# Patient Record
Sex: FEMALE | Race: ASIAN | NOT HISPANIC OR LATINO | Employment: FULL TIME | URBAN - METROPOLITAN AREA
[De-identification: names, ages, dates, MRNs, and addresses within clinical notes are randomized per-mention and may not be internally consistent; named-entity substitution may affect disease eponyms.]

---

## 2023-04-28 ENCOUNTER — OFFICE VISIT (OUTPATIENT)
Dept: PRIMARY CARE | Facility: CLINIC | Age: 53
End: 2023-04-28
Payer: COMMERCIAL

## 2023-04-28 VITALS
BODY MASS INDEX: 23.74 KG/M2 | HEIGHT: 63 IN | SYSTOLIC BLOOD PRESSURE: 107 MMHG | DIASTOLIC BLOOD PRESSURE: 66 MMHG | HEART RATE: 88 BPM | WEIGHT: 134 LBS | OXYGEN SATURATION: 97 %

## 2023-04-28 DIAGNOSIS — Z12.31 ENCOUNTER FOR SCREENING MAMMOGRAM FOR MALIGNANT NEOPLASM OF BREAST: ICD-10-CM

## 2023-04-28 DIAGNOSIS — N94.6 DYSMENORRHEA: ICD-10-CM

## 2023-04-28 DIAGNOSIS — Z00.00 ANNUAL PHYSICAL EXAM: Primary | ICD-10-CM

## 2023-04-28 DIAGNOSIS — Z12.11 ENCOUNTER FOR SCREENING FOR MALIGNANT NEOPLASM OF COLON: ICD-10-CM

## 2023-04-28 DIAGNOSIS — R79.0 ABNORMAL SERUM IRON LEVEL: ICD-10-CM

## 2023-04-28 DIAGNOSIS — R89.9 ABNORMAL LABORATORY TEST RESULT: ICD-10-CM

## 2023-04-28 LAB
ALANINE AMINOTRANSFERASE (SGPT) (U/L) IN SER/PLAS: 14 U/L (ref 7–45)
ALBUMIN (G/DL) IN SER/PLAS: 4.7 G/DL (ref 3.4–5)
ALKALINE PHOSPHATASE (U/L) IN SER/PLAS: 74 U/L (ref 33–110)
ANION GAP IN SER/PLAS: 15 MMOL/L (ref 10–20)
ASPARTATE AMINOTRANSFERASE (SGOT) (U/L) IN SER/PLAS: 16 U/L (ref 9–39)
BILIRUBIN TOTAL (MG/DL) IN SER/PLAS: 1.3 MG/DL (ref 0–1.2)
CALCIUM (MG/DL) IN SER/PLAS: 9.6 MG/DL (ref 8.6–10.6)
CARBON DIOXIDE, TOTAL (MMOL/L) IN SER/PLAS: 28 MMOL/L (ref 21–32)
CHLORIDE (MMOL/L) IN SER/PLAS: 104 MMOL/L (ref 98–107)
CHOLESTEROL (MG/DL) IN SER/PLAS: 196 MG/DL (ref 0–199)
CHOLESTEROL IN HDL (MG/DL) IN SER/PLAS: 57.6 MG/DL
CHOLESTEROL/HDL RATIO: 3.4
CREATININE (MG/DL) IN SER/PLAS: 0.8 MG/DL (ref 0.5–1.05)
ERYTHROCYTE DISTRIBUTION WIDTH (RATIO) BY AUTOMATED COUNT: 12.3 % (ref 11.5–14.5)
ERYTHROCYTE MEAN CORPUSCULAR HEMOGLOBIN CONCENTRATION (G/DL) BY AUTOMATED: 34.7 G/DL (ref 32–36)
ERYTHROCYTE MEAN CORPUSCULAR VOLUME (FL) BY AUTOMATED COUNT: 93 FL (ref 80–100)
ERYTHROCYTES (10*6/UL) IN BLOOD BY AUTOMATED COUNT: 4.99 X10E12/L (ref 4–5.2)
ESTIMATED AVERAGE GLUCOSE FOR HBA1C: 91 MG/DL
FERRITIN (UG/LL) IN SER/PLAS: 96 UG/L (ref 8–150)
GFR FEMALE: 88 ML/MIN/1.73M2
GLUCOSE (MG/DL) IN SER/PLAS: 84 MG/DL (ref 74–99)
HEMATOCRIT (%) IN BLOOD BY AUTOMATED COUNT: 46.4 % (ref 36–46)
HEMOGLOBIN (G/DL) IN BLOOD: 16.1 G/DL (ref 12–16)
HEMOGLOBIN A1C/HEMOGLOBIN TOTAL IN BLOOD: 4.8 %
IRON (UG/DL) IN SER/PLAS: 267 UG/DL (ref 35–150)
IRON BINDING CAPACITY (UG/DL) IN SER/PLAS: 357 UG/DL (ref 240–445)
IRON SATURATION (%) IN SER/PLAS: 75 % (ref 25–45)
LDL: 118 MG/DL (ref 0–99)
LEUKOCYTES (10*3/UL) IN BLOOD BY AUTOMATED COUNT: 5.5 X10E9/L (ref 4.4–11.3)
NRBC (PER 100 WBCS) BY AUTOMATED COUNT: 0 /100 WBC (ref 0–0)
PLATELETS (10*3/UL) IN BLOOD AUTOMATED COUNT: 185 X10E9/L (ref 150–450)
POTASSIUM (MMOL/L) IN SER/PLAS: 4.1 MMOL/L (ref 3.5–5.3)
PROTEIN TOTAL: 6.9 G/DL (ref 6.4–8.2)
SODIUM (MMOL/L) IN SER/PLAS: 143 MMOL/L (ref 136–145)
THYROTROPIN (MIU/L) IN SER/PLAS BY DETECTION LIMIT <= 0.05 MIU/L: 2.74 MIU/L (ref 0.44–3.98)
TRANSFERRIN (MG/DL) IN SER/PLAS: 248 MG/DL (ref 200–360)
TRIGLYCERIDE (MG/DL) IN SER/PLAS: 104 MG/DL (ref 0–149)
UREA NITROGEN (MG/DL) IN SER/PLAS: 16 MG/DL (ref 6–23)
VLDL: 21 MG/DL (ref 0–40)

## 2023-04-28 PROCEDURE — 80053 COMPREHEN METABOLIC PANEL: CPT

## 2023-04-28 PROCEDURE — 84443 ASSAY THYROID STIM HORMONE: CPT

## 2023-04-28 PROCEDURE — 82728 ASSAY OF FERRITIN: CPT

## 2023-04-28 PROCEDURE — 99396 PREV VISIT EST AGE 40-64: CPT | Performed by: INTERNAL MEDICINE

## 2023-04-28 PROCEDURE — 83540 ASSAY OF IRON: CPT

## 2023-04-28 PROCEDURE — 84466 ASSAY OF TRANSFERRIN: CPT

## 2023-04-28 PROCEDURE — 36415 COLL VENOUS BLD VENIPUNCTURE: CPT | Performed by: INTERNAL MEDICINE

## 2023-04-28 PROCEDURE — 1036F TOBACCO NON-USER: CPT | Performed by: INTERNAL MEDICINE

## 2023-04-28 PROCEDURE — 83036 HEMOGLOBIN GLYCOSYLATED A1C: CPT

## 2023-04-28 PROCEDURE — 80061 LIPID PANEL: CPT

## 2023-04-28 PROCEDURE — 85027 COMPLETE CBC AUTOMATED: CPT

## 2023-04-28 ASSESSMENT — PATIENT HEALTH QUESTIONNAIRE - PHQ9
SUM OF ALL RESPONSES TO PHQ9 QUESTIONS 1 AND 2: 0
2. FEELING DOWN, DEPRESSED OR HOPELESS: NOT AT ALL
1. LITTLE INTEREST OR PLEASURE IN DOING THINGS: NOT AT ALL

## 2023-04-28 NOTE — PROGRESS NOTES
"Subjective   Patient ID: Paradise Paris is a 52 y.o. female who presents for Annual Exam (Patient here for complete physical exam).      Review of Systems  Heavy menstrual blood loss history  Absence of menstruation 4 months    Objective   /66   Pulse 88   Ht 1.6 m (5' 3\")   Wt 60.8 kg (134 lb)   SpO2 97%   BMI 23.74 kg/m²     Physical Exam  NAD. Cooperative.  HEENT: WNL  Neck: WNL  Lungs CTA  Heart: RRR  Abdomen: WNL  Musculoskeletal system: WNL  Neurologic exam: WNL    Assessment/Plan   Diagnoses and all orders for this visit:  Annual physical exam  -     CBC; Future  -     TSH; Future  -     Hemoglobin A1C; Future  -     Lipid panel; Future  -     Comprehensive metabolic panel; Future  Encounter for screening mammogram for malignant neoplasm of breast  -     BI mammo bilateral screening tomosynthesis; Future  Encounter for screening for malignant neoplasm of colon  -     Cologuard® colon cancer screening; Future  Dysmenorrhea  -     CBC; Future  -     Iron and TIBC; Future  -     Ferritin; Future  -     Transferrin; Future    Tdap vaccine is due, postponed by the patient.  GYN exam is recommended, scheduling contact was provided.     "

## 2023-05-19 LAB — NONINV COLON CA DNA+OCC BLD SCRN STL QL: NEGATIVE

## 2023-07-19 ENCOUNTER — OFFICE VISIT (OUTPATIENT)
Dept: PRIMARY CARE | Facility: CLINIC | Age: 53
End: 2023-07-19
Payer: COMMERCIAL

## 2023-07-19 VITALS
DIASTOLIC BLOOD PRESSURE: 70 MMHG | BODY MASS INDEX: 23.21 KG/M2 | SYSTOLIC BLOOD PRESSURE: 120 MMHG | HEART RATE: 85 BPM | OXYGEN SATURATION: 97 % | WEIGHT: 131 LBS

## 2023-07-19 DIAGNOSIS — R89.9 ABNORMAL LABORATORY TEST RESULT: Primary | ICD-10-CM

## 2023-07-19 PROBLEM — E78.00 ELEVATED LDL CHOLESTEROL LEVEL: Status: ACTIVE | Noted: 2023-07-19

## 2023-07-19 PROBLEM — L30.9 ECZEMA: Status: ACTIVE | Noted: 2023-07-19

## 2023-07-19 PROBLEM — D58.2 ELEVATED HEMOGLOBIN (CMS-HCC): Status: ACTIVE | Noted: 2023-07-19

## 2023-07-19 PROBLEM — R92.30 DENSE BREAST TISSUE ON MAMMOGRAM: Status: ACTIVE | Noted: 2023-07-19

## 2023-07-19 PROBLEM — K21.9 ACID REFLUX: Status: ACTIVE | Noted: 2023-07-19

## 2023-07-19 LAB
ERYTHROCYTE DISTRIBUTION WIDTH (RATIO) BY AUTOMATED COUNT: 12.4 % (ref 11.5–14.5)
ERYTHROCYTE MEAN CORPUSCULAR HEMOGLOBIN CONCENTRATION (G/DL) BY AUTOMATED: 35.3 G/DL (ref 32–36)
ERYTHROCYTE MEAN CORPUSCULAR VOLUME (FL) BY AUTOMATED COUNT: 92 FL (ref 80–100)
ERYTHROCYTES (10*6/UL) IN BLOOD BY AUTOMATED COUNT: 5.03 X10E12/L (ref 4–5.2)
FERRITIN (UG/LL) IN SER/PLAS: 129 UG/L (ref 8–150)
HEMATOCRIT (%) IN BLOOD BY AUTOMATED COUNT: 46.5 % (ref 36–46)
HEMOGLOBIN (G/DL) IN BLOOD: 16.4 G/DL (ref 12–16)
IRON (UG/DL) IN SER/PLAS: 162 UG/DL (ref 35–150)
IRON BINDING CAPACITY (UG/DL) IN SER/PLAS: 352 UG/DL (ref 240–445)
IRON SATURATION (%) IN SER/PLAS: 46 % (ref 25–45)
LEUKOCYTES (10*3/UL) IN BLOOD BY AUTOMATED COUNT: 5.5 X10E9/L (ref 4.4–11.3)
NRBC (PER 100 WBCS) BY AUTOMATED COUNT: 0 /100 WBC (ref 0–0)
PLATELETS (10*3/UL) IN BLOOD AUTOMATED COUNT: 178 X10E9/L (ref 150–450)
TRANSFERRIN (MG/DL) IN SER/PLAS: 241 MG/DL (ref 200–360)

## 2023-07-19 PROCEDURE — 1036F TOBACCO NON-USER: CPT | Performed by: INTERNAL MEDICINE

## 2023-07-19 PROCEDURE — 99213 OFFICE O/P EST LOW 20 MIN: CPT | Performed by: INTERNAL MEDICINE

## 2023-07-19 PROCEDURE — 83540 ASSAY OF IRON: CPT

## 2023-07-19 PROCEDURE — 85027 COMPLETE CBC AUTOMATED: CPT

## 2023-07-19 PROCEDURE — 84466 ASSAY OF TRANSFERRIN: CPT

## 2023-07-19 PROCEDURE — 82728 ASSAY OF FERRITIN: CPT

## 2023-07-19 NOTE — PROGRESS NOTES
Subjective   Patient ID: Paradise Paris is a 52 y.o. female who presents for Follow-up (Patient here for follow-up visit for low iron).    HPI   Lab follow up, elevated transferrin saturation and total serum Iron.  No complaints  Review of Systems  Negative   Objective   Pulse 85   Wt 59.4 kg (131 lb)   SpO2 97%   BMI 23.21 kg/m²     Physical Exam  NAD. Cooperative.  Lungs CTA  Heart: RRR      Assessment/Plan   Diagnoses and all orders for this visit:  Abnormal laboratory test result  -     Ferritin; Future  -     Transferrin; Future  -     Iron and TIBC; Future  -     CBC; Future  -     Iron and TIBC  Review of the lab results, recommendations to follow.

## 2024-04-18 ENCOUNTER — OFFICE VISIT (OUTPATIENT)
Dept: PRIMARY CARE | Facility: CLINIC | Age: 54
End: 2024-04-18
Payer: COMMERCIAL

## 2024-04-18 VITALS
HEART RATE: 83 BPM | DIASTOLIC BLOOD PRESSURE: 70 MMHG | BODY MASS INDEX: 23.74 KG/M2 | SYSTOLIC BLOOD PRESSURE: 120 MMHG | WEIGHT: 134 LBS | OXYGEN SATURATION: 98 %

## 2024-04-18 DIAGNOSIS — E78.00 ELEVATED LDL CHOLESTEROL LEVEL: ICD-10-CM

## 2024-04-18 DIAGNOSIS — Z28.21 REFUSED DIPHTHERIA-TETANUS VACCINE: ICD-10-CM

## 2024-04-18 DIAGNOSIS — Z00.00 ANNUAL PHYSICAL EXAM: Primary | ICD-10-CM

## 2024-04-18 DIAGNOSIS — Z12.31 ENCOUNTER FOR SCREENING MAMMOGRAM FOR MALIGNANT NEOPLASM OF BREAST: ICD-10-CM

## 2024-04-18 DIAGNOSIS — Z86.39 HISTORY OF THYROID NODULE: ICD-10-CM

## 2024-04-18 DIAGNOSIS — D58.2 ELEVATED HEMOGLOBIN (CMS-HCC): ICD-10-CM

## 2024-04-18 PROCEDURE — 1036F TOBACCO NON-USER: CPT | Performed by: INTERNAL MEDICINE

## 2024-04-18 PROCEDURE — G0439 PPPS, SUBSEQ VISIT: HCPCS | Performed by: INTERNAL MEDICINE

## 2024-04-18 ASSESSMENT — PATIENT HEALTH QUESTIONNAIRE - PHQ9
2. FEELING DOWN, DEPRESSED OR HOPELESS: NOT AT ALL
1. LITTLE INTEREST OR PLEASURE IN DOING THINGS: NOT AT ALL
SUM OF ALL RESPONSES TO PHQ9 QUESTIONS 1 AND 2: 0

## 2024-04-18 NOTE — PROGRESS NOTES
Subjective   Patient ID: Paradise Paris is a 53 y.o. female who presents for Annual Exam (Patient here for complete physical exam).    HPI   The patient presents for an annual wellness exam with no concerns.    Review of Systems  Negative  Objective   /70   Pulse 83   Wt 60.8 kg (134 lb)   SpO2 98%   BMI 23.74 kg/m²     Physical Exam  NAD. Cooperative.  HEENT: WNL  Neck: WNL  Lungs CTA  Heart: RRR  Abdomen: WNL  Musculoskeletal system: WNL  Neurologic exam: WNL    Assessment/Plan   Diagnoses and all orders for this visit:  Annual physical exam  -     CBC; Future  -     Lipid panel; Future  -     Hemoglobin A1C; Future  -     Comprehensive metabolic panel; Future  -     TSH; Future  Encounter for screening mammogram for malignant neoplasm of breast  -     BI mammo bilateral screening tomosynthesis; Future  Elevated LDL cholesterol level  -     CT cardiac scoring wo IV contrast; Future  Elevated hemoglobin (CMS-HCC)  -     Transferrin; Future  -     Iron and TIBC; Future  -     Ferritin; Future  History of thyroid nodule  -     US thyroid; Future  Refused diphtheria-tetanus vaccine

## 2024-04-19 ENCOUNTER — LAB (OUTPATIENT)
Dept: LAB | Facility: LAB | Age: 54
End: 2024-04-19
Payer: COMMERCIAL

## 2024-04-19 DIAGNOSIS — D58.2 ELEVATED HEMOGLOBIN (CMS-HCC): ICD-10-CM

## 2024-04-19 DIAGNOSIS — Z00.00 ANNUAL PHYSICAL EXAM: ICD-10-CM

## 2024-04-19 LAB
ALBUMIN SERPL BCP-MCNC: 4.4 G/DL (ref 3.4–5)
ALP SERPL-CCNC: 80 U/L (ref 33–110)
ALT SERPL W P-5'-P-CCNC: 13 U/L (ref 7–45)
ANION GAP SERPL CALC-SCNC: 13 MMOL/L (ref 10–20)
AST SERPL W P-5'-P-CCNC: 15 U/L (ref 9–39)
BILIRUB SERPL-MCNC: 1 MG/DL (ref 0–1.2)
BUN SERPL-MCNC: 16 MG/DL (ref 6–23)
CALCIUM SERPL-MCNC: 10 MG/DL (ref 8.6–10.6)
CHLORIDE SERPL-SCNC: 106 MMOL/L (ref 98–107)
CHOLEST SERPL-MCNC: 229 MG/DL (ref 0–199)
CHOLESTEROL/HDL RATIO: 5
CO2 SERPL-SCNC: 29 MMOL/L (ref 21–32)
CREAT SERPL-MCNC: 0.75 MG/DL (ref 0.5–1.05)
EGFRCR SERPLBLD CKD-EPI 2021: >90 ML/MIN/1.73M*2
ERYTHROCYTE [DISTWIDTH] IN BLOOD BY AUTOMATED COUNT: 11.9 % (ref 11.5–14.5)
EST. AVERAGE GLUCOSE BLD GHB EST-MCNC: 88 MG/DL
FERRITIN SERPL-MCNC: 218 NG/ML (ref 8–150)
GLUCOSE SERPL-MCNC: 81 MG/DL (ref 74–99)
HBA1C MFR BLD: 4.7 %
HCT VFR BLD AUTO: 46.3 % (ref 36–46)
HDLC SERPL-MCNC: 46 MG/DL
HGB BLD-MCNC: 15.8 G/DL (ref 12–16)
IRON SATN MFR SERPL: 45 % (ref 25–45)
IRON SERPL-MCNC: 147 UG/DL (ref 35–150)
LDLC SERPL CALC-MCNC: 145 MG/DL
MCH RBC QN AUTO: 31.8 PG (ref 26–34)
MCHC RBC AUTO-ENTMCNC: 34.1 G/DL (ref 32–36)
MCV RBC AUTO: 93 FL (ref 80–100)
NON HDL CHOLESTEROL: 183 MG/DL (ref 0–149)
NRBC BLD-RTO: 0 /100 WBCS (ref 0–0)
PLATELET # BLD AUTO: 176 X10*3/UL (ref 150–450)
POTASSIUM SERPL-SCNC: 4.1 MMOL/L (ref 3.5–5.3)
PROT SERPL-MCNC: 6.8 G/DL (ref 6.4–8.2)
RBC # BLD AUTO: 4.97 X10*6/UL (ref 4–5.2)
SODIUM SERPL-SCNC: 144 MMOL/L (ref 136–145)
TIBC SERPL-MCNC: 328 UG/DL (ref 240–445)
TRANSFERRIN SERPL-MCNC: 227 MG/DL (ref 200–360)
TRIGL SERPL-MCNC: 192 MG/DL (ref 0–149)
TSH SERPL-ACNC: 5.77 MIU/L (ref 0.44–3.98)
UIBC SERPL-MCNC: 181 UG/DL (ref 110–370)
VLDL: 38 MG/DL (ref 0–40)
WBC # BLD AUTO: 4.9 X10*3/UL (ref 4.4–11.3)

## 2024-04-19 PROCEDURE — 83550 IRON BINDING TEST: CPT

## 2024-04-19 PROCEDURE — 83036 HEMOGLOBIN GLYCOSYLATED A1C: CPT

## 2024-04-19 PROCEDURE — 83540 ASSAY OF IRON: CPT

## 2024-04-19 PROCEDURE — 80053 COMPREHEN METABOLIC PANEL: CPT

## 2024-04-19 PROCEDURE — 84443 ASSAY THYROID STIM HORMONE: CPT

## 2024-04-19 PROCEDURE — 80061 LIPID PANEL: CPT

## 2024-04-19 PROCEDURE — 85027 COMPLETE CBC AUTOMATED: CPT

## 2024-04-19 PROCEDURE — 82728 ASSAY OF FERRITIN: CPT

## 2024-04-19 PROCEDURE — 84466 ASSAY OF TRANSFERRIN: CPT

## 2024-04-19 PROCEDURE — 36415 COLL VENOUS BLD VENIPUNCTURE: CPT

## 2024-04-26 ENCOUNTER — HOSPITAL ENCOUNTER (OUTPATIENT)
Dept: RADIOLOGY | Facility: HOSPITAL | Age: 54
Discharge: HOME | End: 2024-04-26
Payer: COMMERCIAL

## 2024-04-26 DIAGNOSIS — Z86.39 HISTORY OF THYROID NODULE: ICD-10-CM

## 2024-04-26 PROCEDURE — 76536 US EXAM OF HEAD AND NECK: CPT

## 2024-05-16 ENCOUNTER — HOSPITAL ENCOUNTER (OUTPATIENT)
Dept: RADIOLOGY | Facility: CLINIC | Age: 54
Discharge: HOME | End: 2024-05-16
Payer: COMMERCIAL

## 2024-05-16 VITALS — HEIGHT: 63 IN | BODY MASS INDEX: 23.75 KG/M2 | WEIGHT: 134.04 LBS

## 2024-05-16 DIAGNOSIS — Z12.31 ENCOUNTER FOR SCREENING MAMMOGRAM FOR MALIGNANT NEOPLASM OF BREAST: ICD-10-CM

## 2024-05-16 PROCEDURE — 77067 SCR MAMMO BI INCL CAD: CPT

## 2024-05-16 PROCEDURE — 77067 SCR MAMMO BI INCL CAD: CPT | Performed by: RADIOLOGY

## 2024-05-16 PROCEDURE — 77063 BREAST TOMOSYNTHESIS BI: CPT | Performed by: RADIOLOGY

## 2024-05-28 ENCOUNTER — APPOINTMENT (OUTPATIENT)
Dept: PRIMARY CARE | Facility: CLINIC | Age: 54
End: 2024-05-28
Payer: COMMERCIAL

## 2024-07-19 ENCOUNTER — APPOINTMENT (OUTPATIENT)
Dept: RADIOLOGY | Facility: CLINIC | Age: 54
End: 2024-07-19
Payer: COMMERCIAL

## 2024-07-19 ENCOUNTER — HOSPITAL ENCOUNTER (OUTPATIENT)
Dept: RADIOLOGY | Facility: CLINIC | Age: 54
Discharge: HOME | End: 2024-07-19
Payer: COMMERCIAL

## 2024-07-19 DIAGNOSIS — E78.00 ELEVATED LDL CHOLESTEROL LEVEL: ICD-10-CM

## 2024-07-19 PROCEDURE — 75571 CT HRT W/O DYE W/CA TEST: CPT

## 2024-07-21 DIAGNOSIS — R91.8 ABNORMAL COMPUTED TOMOGRAPHY OF LUNG: Primary | ICD-10-CM

## 2024-08-07 ENCOUNTER — HOSPITAL ENCOUNTER (OUTPATIENT)
Dept: RADIOLOGY | Facility: HOSPITAL | Age: 54
Discharge: HOME | End: 2024-08-07
Payer: COMMERCIAL

## 2024-08-07 DIAGNOSIS — R91.8 ABNORMAL COMPUTED TOMOGRAPHY OF LUNG: ICD-10-CM

## 2024-08-07 PROCEDURE — 71250 CT THORAX DX C-: CPT

## 2024-08-07 PROCEDURE — 71250 CT THORAX DX C-: CPT | Performed by: RADIOLOGY

## 2024-08-08 DIAGNOSIS — J84.10 PULMONARY FIBROSIS, UNSPECIFIED (MULTI): Primary | ICD-10-CM

## 2024-08-09 ENCOUNTER — TELEPHONE (OUTPATIENT)
Dept: PRIMARY CARE | Facility: CLINIC | Age: 54
End: 2024-08-09
Payer: COMMERCIAL

## 2024-09-10 PROBLEM — N63.20 LEFT BREAST MASS: Status: ACTIVE | Noted: 2024-09-10

## 2024-09-10 PROBLEM — J30.9 ALLERGIC RHINITIS: Status: ACTIVE | Noted: 2024-09-10

## 2024-09-10 PROBLEM — M25.511 SHOULDER PAIN, RIGHT: Status: ACTIVE | Noted: 2024-09-10

## 2024-09-10 PROBLEM — R51.9 HEADACHE: Status: ACTIVE | Noted: 2024-09-10

## 2024-09-10 PROBLEM — H04.123 DRY EYE SYNDROME OF BOTH EYES: Status: ACTIVE | Noted: 2024-02-26

## 2024-09-10 PROBLEM — H43.393 VITREOUS FLOATERS OF BOTH EYES: Status: ACTIVE | Noted: 2024-02-26

## 2024-09-10 PROBLEM — R10.31 RIGHT LOWER QUADRANT ABDOMINAL PAIN: Status: ACTIVE | Noted: 2024-09-10

## 2024-09-10 PROBLEM — E55.9 VITAMIN D DEFICIENCY: Status: ACTIVE | Noted: 2024-09-10

## 2024-09-10 PROBLEM — E04.1 THYROID NODULE: Status: ACTIVE | Noted: 2024-09-10

## 2024-09-10 PROBLEM — R09.89 THROAT CLEARING: Status: ACTIVE | Noted: 2024-09-10

## 2024-09-10 PROBLEM — R92.8 ABNORMAL MAMMOGRAM: Status: ACTIVE | Noted: 2024-09-10

## 2024-09-10 PROBLEM — R92.2 INCONCLUSIVE MAMMOGRAM: Status: ACTIVE | Noted: 2024-09-10

## 2024-09-10 PROBLEM — R10.9 ABDOMINAL DISCOMFORT: Status: ACTIVE | Noted: 2024-09-10

## 2024-09-10 RX ORDER — SODIUM FLUORIDE 6 MG/ML
PASTE, DENTIFRICE DENTAL
COMMUNITY
Start: 2023-09-29

## 2024-09-10 RX ORDER — CHOLECALCIFEROL (VITAMIN D3) 50 MCG
TABLET ORAL
COMMUNITY
Start: 2019-09-20

## 2024-09-10 RX ORDER — MUPIROCIN 20 MG/G
OINTMENT TOPICAL
COMMUNITY
Start: 2024-03-26

## 2024-09-10 RX ORDER — AZELASTINE 1 MG/ML
SPRAY, METERED NASAL 2 TIMES DAILY
COMMUNITY
Start: 2022-05-19

## 2024-09-17 ENCOUNTER — OFFICE VISIT (OUTPATIENT)
Dept: PULMONOLOGY | Facility: CLINIC | Age: 54
End: 2024-09-17
Payer: COMMERCIAL

## 2024-09-17 VITALS
OXYGEN SATURATION: 98 % | BODY MASS INDEX: 23.71 KG/M2 | SYSTOLIC BLOOD PRESSURE: 122 MMHG | TEMPERATURE: 97.5 F | RESPIRATION RATE: 18 BRPM | WEIGHT: 133.8 LBS | HEART RATE: 91 BPM | DIASTOLIC BLOOD PRESSURE: 81 MMHG

## 2024-09-17 DIAGNOSIS — J84.10 PULMONARY FIBROSIS, UNSPECIFIED (MULTI): ICD-10-CM

## 2024-09-17 PROCEDURE — 99214 OFFICE O/P EST MOD 30 MIN: CPT | Performed by: INTERNAL MEDICINE

## 2024-09-17 PROCEDURE — 1036F TOBACCO NON-USER: CPT | Performed by: INTERNAL MEDICINE

## 2024-09-17 PROCEDURE — 99204 OFFICE O/P NEW MOD 45 MIN: CPT | Performed by: INTERNAL MEDICINE

## 2024-09-17 ASSESSMENT — ENCOUNTER SYMPTOMS
CONSTIPATION: 0
AGITATION: 0
WHEEZING: 0
NERVOUS/ANXIOUS: 0
PALPITATIONS: 0
NUMBNESS: 0
OCCASIONAL FEELINGS OF UNSTEADINESS: 0
LOSS OF SENSATION IN FEET: 0
TREMORS: 0
CHOKING: 0
SINUS PRESSURE: 0
FATIGUE: 0
SPEECH DIFFICULTY: 0
ADENOPATHY: 0
EYE DISCHARGE: 0
EYE REDNESS: 0
SLEEP DISTURBANCE: 0
FREQUENCY: 0
DIFFICULTY URINATING: 0
FEVER: 0
HEMATURIA: 0
DYSURIA: 0
NAUSEA: 0
STRIDOR: 0
DEPRESSION: 0
JOINT SWELLING: 0
WEAKNESS: 0
UNEXPECTED WEIGHT CHANGE: 0
DIZZINESS: 0
ABDOMINAL PAIN: 0
BRUISES/BLEEDS EASILY: 0
SHORTNESS OF BREATH: 0
RHINORRHEA: 0
COUGH: 0
ABDOMINAL DISTENTION: 0
ARTHRALGIAS: 0
FACIAL SWELLING: 0
APNEA: 0
HEADACHES: 0
LIGHT-HEADEDNESS: 0
SINUS PAIN: 0

## 2024-09-17 NOTE — PATIENT INSTRUCTIONS
Probably one is dealing with old scar tissue in the left lung from a previous pneumonia    For completeness would just repeat CT scan in one year

## 2024-09-17 NOTE — LETTER
September 17, 2024     Kelley Batista MD  [address]  [address]  [facility] [address]    Patient: Paradise Paris   YOB: 1970   Date of Visit: 9/17/2024       Dear Dr. Kelley Batista MD:    Thank you for referring Paradise Paris to me for evaluation. Below are my notes for this consultation.  If you have questions, please do not hesitate to call me. I look forward to following your patient along with you.       Sincerely,     Tomas Enriquez MD MPH      CC: No Recipients  ______________________________________________________________________________________    @PULMONARY CONSULTATION@         Reason for Consult: pulmonary scarring     ASSESSMENT:   The patient is a 53-year-old with a history of an elevated hemoglobin, hypercholesterolemia who likely had pneumonia in the remote past.  The scarring that is present on her CT scan is posterior in nature blunting the left costophrenic angle.  The CT scan was reviewed with the patient.  There is no diffuse fibrosis in the lungs without an active pulmonary process occurring.  She is a never smoker and has no exercise limitations.  For completeness I told her that probably she should have a follow-up CT scan 1 year.  In addition, although she has no history of sleep apnea because of the elevated hemoglobin I  will order a nocturnal  pulse oximetry to check for desaturation at night.    PLAN:   Follow-up CT scan in 1 year; also, will order a nocturnal pulse oximetry            HISTORY OF PRESENT ILLNESS:           The patient is a 53-year-old with a history of an elevated hemoglobin and hypercholesterolemia.  She also has been followed for thyroid nodule.  Her hemoglobin has been somewhat elevated around 16 g/dL her ferritin on April 19, 2024 was 218 ng/mL previously having been normal.      A cardiac score from July 20, 2024 revealed the following  1. Coronary artery calcium score of 0*.  2. An area of ground-glass opacity in the left lung base,  partially  imaged and incompletely characterized. Recommend dedicated CT of the  chest for better assessment.    A CT scan from 2024 revealed the following  LUNGS and AIRWAYS:  4 mm nodularity in the right lower lung on image 187 of series 203 is  secondary to a subsegmental intrapulmonary node considering  orthogonal reconstructions. Subpleural opacity in the left upper lung  anteriorly measuring up to approximately 7 mm such as on image 93 of  series 203 also appears to be due to intrapulmonary lymphoid  hypertrophy and pleural attachment. Ground-glass appearing opacity at  the left costophrenic angle posteriorly on the axial images appears  to be secondary to fibrosis and perhaps atelectasis on the sagittal  reconstructions. This would account for the opacity described on the  previous CT scan. Otherwise no discrete infiltrate or effusion    1.  Fibrosis at the left costophrenic angle posteriorly would account  for the opacity of concern on the prior scan      The patient reports having no coughing congestion wheezing PND orthopnea.  In addition, there is no history of sleep apnea.  She probably had a pneumonia in the remote past.  She is a never smoker.  She did have secondhand smoke exposure over the years.  She also has throat irritation and has seen ENT in the past.  She has minimal GERD related symptoms. Her father  of stomach cancer and she is always concerned about this.  She does not drink coffee and she drinks only minimal alcohol.  She has no history of asthma.  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  She has no exercise limitation.  No Known Allergies     PAST MEDICAL HISTORY:   history of an elevated hemoglobin and hypercholesterolemia.  She also has been followed for thyroid nodule.  Her hemoglobin has been somewhat elevated around 16 g/dL her ferritin on 2024 was 218 ng/mL previously having been normal.     Current Outpatient Medications:   •   cholecalciferol (Vitamin D-3) 50 MCG (2000 UT) tablet, Take by mouth., Disp: , Rfl:   •  azelastine (Astelin) 137 mcg (0.1 %) nasal spray, Administer into affected nostril(s) twice a day., Disp: , Rfl:   •  fluoride, sodium, (Prevident 5000 Booster) 1.1 % dental paste, PLEASE SEE ATTACHED FOR DETAILED DIRECTIONS, Disp: , Rfl:   •  mupirocin (Bactroban) 2 % ointment, APPLY TO AFFECTED AREA 3 TIMES A DAY FOR 7 DAYS, Disp: , Rfl:      FAMILY HISTORY:   Father  of stomach cancer.  There is nobody in the family with any respiratory problems.  SOCIAL HISTORY:  The patient is a never smoker, but has had secondhand smoke exposure over the years.  Minimal EtOH consumption.  EXPOSURE AND WORK HISTORY:  She does accounting and tries to exercise    Review of Systems   Constitutional:  Negative for fatigue, fever and unexpected weight change.   HENT:  Negative for congestion, facial swelling, nosebleeds, postnasal drip, rhinorrhea, sinus pressure and sinus pain.         Chronic throat irritation   Eyes:  Negative for discharge, redness and visual disturbance.   Respiratory:  Negative for apnea, cough, choking, shortness of breath, wheezing and stridor.    Cardiovascular:  Negative for chest pain, palpitations and leg swelling.   Gastrointestinal:  Negative for abdominal distention, abdominal pain, constipation and nausea.        Some GERD   Endocrine: Negative for cold intolerance and heat intolerance.   Genitourinary:  Negative for difficulty urinating, dysuria, frequency and hematuria.   Musculoskeletal:  Negative for arthralgias, gait problem and joint swelling.   Allergic/Immunologic: Negative for environmental allergies, food allergies and immunocompromised state.   Neurological:  Negative for dizziness, tremors, syncope, speech difficulty, weakness, light-headedness, numbness and headaches.   Hematological:  Negative for adenopathy. Does not bruise/bleed easily.   Psychiatric/Behavioral:  Negative for agitation,  behavioral problems and sleep disturbance. The patient is not nervous/anxious.         Vitals:    09/17/24 0935   BP: 122/81   Pulse: 91   Resp: 18   Temp: 36.4 °C (97.5 °F)   SpO2: 98%        Physical Exam  Vitals reviewed.   Constitutional:       Appearance: Normal appearance.   HENT:      Head: Normocephalic and atraumatic.   Eyes:      Extraocular Movements: Extraocular movements intact.   Cardiovascular:      Rate and Rhythm: Normal rate and regular rhythm.      Heart sounds: No murmur heard.     No friction rub. No gallop.   Pulmonary:      Effort: Pulmonary effort is normal. No respiratory distress.      Breath sounds: Normal breath sounds. No stridor. No wheezing, rhonchi or rales.   Chest:      Chest wall: No tenderness.   Abdominal:      General: Abdomen is flat. There is no distension.      Palpations: Abdomen is soft. There is no mass.      Tenderness: There is no abdominal tenderness.   Musculoskeletal:         General: Normal range of motion.      Cervical back: Normal range of motion.      Right lower leg: No edema.      Left lower leg: No edema.   Skin:     General: Skin is warm and dry.   Neurological:      Mental Status: She is alert and oriented to person, place, and time.   Psychiatric:         Mood and Affect: Mood normal.         Behavior: Behavior normal.

## 2024-09-17 NOTE — PROGRESS NOTES
@PULMONARY CONSULTATION@         Reason for Consult: pulmonary scarring     ASSESSMENT:   The patient is a 53-year-old with a history of an elevated hemoglobin, hypercholesterolemia who likely had pneumonia in the remote past.  The scarring that is present on her CT scan is posterior in nature blunting the left costophrenic angle.  The CT scan was reviewed with the patient.  There is no diffuse fibrosis in the lungs without an active pulmonary process occurring.  She is a never smoker and has no exercise limitations.  For completeness I told her that probably she should have a follow-up CT scan 1 year.  In addition, although she has no history of sleep apnea because of the elevated hemoglobin I  will order a nocturnal  pulse oximetry to check for desaturation at night.    PLAN:   Follow-up CT scan in 1 year; also, will order a nocturnal pulse oximetry            HISTORY OF PRESENT ILLNESS:           The patient is a 53-year-old with a history of an elevated hemoglobin and hypercholesterolemia.  She also has been followed for thyroid nodule.  Her hemoglobin has been somewhat elevated around 16 g/dL her ferritin on April 19, 2024 was 218 ng/mL previously having been normal.      A cardiac score from July 20, 2024 revealed the following  1. Coronary artery calcium score of 0*.  2. An area of ground-glass opacity in the left lung base, partially  imaged and incompletely characterized. Recommend dedicated CT of the  chest for better assessment.    A CT scan from August 8, 2024 revealed the following  LUNGS and AIRWAYS:  4 mm nodularity in the right lower lung on image 187 of series 203 is  secondary to a subsegmental intrapulmonary node considering  orthogonal reconstructions. Subpleural opacity in the left upper lung  anteriorly measuring up to approximately 7 mm such as on image 93 of  series 203 also appears to be due to intrapulmonary lymphoid  hypertrophy and pleural attachment. Ground-glass appearing opacity  at  the left costophrenic angle posteriorly on the axial images appears  to be secondary to fibrosis and perhaps atelectasis on the sagittal  reconstructions. This would account for the opacity described on the  previous CT scan. Otherwise no discrete infiltrate or effusion    1.  Fibrosis at the left costophrenic angle posteriorly would account  for the opacity of concern on the prior scan      The patient reports having no coughing congestion wheezing PND orthopnea.  In addition, there is no history of sleep apnea.  She probably had a pneumonia in the remote past.  She is a never smoker.  She did have secondhand smoke exposure over the years.  She also has throat irritation and has seen ENT in the past.  She has minimal GERD related symptoms. Her father  of stomach cancer and she is always concerned about this.  She does not drink coffee and she drinks only minimal alcohol.  She has no history of asthma.  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  She has no exercise limitation.  No Known Allergies     PAST MEDICAL HISTORY:   history of an elevated hemoglobin and hypercholesterolemia.  She also has been followed for thyroid nodule.  Her hemoglobin has been somewhat elevated around 16 g/dL her ferritin on 2024 was 218 ng/mL previously having been normal.     Current Outpatient Medications:     cholecalciferol (Vitamin D-3) 50 MCG (2000 UT) tablet, Take by mouth., Disp: , Rfl:     azelastine (Astelin) 137 mcg (0.1 %) nasal spray, Administer into affected nostril(s) twice a day., Disp: , Rfl:     fluoride, sodium, (Prevident 5000 Booster) 1.1 % dental paste, PLEASE SEE ATTACHED FOR DETAILED DIRECTIONS, Disp: , Rfl:     mupirocin (Bactroban) 2 % ointment, APPLY TO AFFECTED AREA 3 TIMES A DAY FOR 7 DAYS, Disp: , Rfl:      FAMILY HISTORY:   Father  of stomach cancer.  There is nobody in the family with any respiratory problems.  SOCIAL HISTORY:  The patient is a never smoker,  but has had secondhand smoke exposure over the years.  Minimal EtOH consumption.  EXPOSURE AND WORK HISTORY:  She does accounting and tries to exercise    Review of Systems   Constitutional:  Negative for fatigue, fever and unexpected weight change.   HENT:  Negative for congestion, facial swelling, nosebleeds, postnasal drip, rhinorrhea, sinus pressure and sinus pain.         Chronic throat irritation   Eyes:  Negative for discharge, redness and visual disturbance.   Respiratory:  Negative for apnea, cough, choking, shortness of breath, wheezing and stridor.    Cardiovascular:  Negative for chest pain, palpitations and leg swelling.   Gastrointestinal:  Negative for abdominal distention, abdominal pain, constipation and nausea.        Some GERD   Endocrine: Negative for cold intolerance and heat intolerance.   Genitourinary:  Negative for difficulty urinating, dysuria, frequency and hematuria.   Musculoskeletal:  Negative for arthralgias, gait problem and joint swelling.   Allergic/Immunologic: Negative for environmental allergies, food allergies and immunocompromised state.   Neurological:  Negative for dizziness, tremors, syncope, speech difficulty, weakness, light-headedness, numbness and headaches.   Hematological:  Negative for adenopathy. Does not bruise/bleed easily.   Psychiatric/Behavioral:  Negative for agitation, behavioral problems and sleep disturbance. The patient is not nervous/anxious.         Vitals:    09/17/24 0935   BP: 122/81   Pulse: 91   Resp: 18   Temp: 36.4 °C (97.5 °F)   SpO2: 98%        Physical Exam  Vitals reviewed.   Constitutional:       Appearance: Normal appearance.   HENT:      Head: Normocephalic and atraumatic.   Eyes:      Extraocular Movements: Extraocular movements intact.   Cardiovascular:      Rate and Rhythm: Normal rate and regular rhythm.      Heart sounds: No murmur heard.     No friction rub. No gallop.   Pulmonary:      Effort: Pulmonary effort is normal. No  respiratory distress.      Breath sounds: Normal breath sounds. No stridor. No wheezing, rhonchi or rales.   Chest:      Chest wall: No tenderness.   Abdominal:      General: Abdomen is flat. There is no distension.      Palpations: Abdomen is soft. There is no mass.      Tenderness: There is no abdominal tenderness.   Musculoskeletal:         General: Normal range of motion.      Cervical back: Normal range of motion.      Right lower leg: No edema.      Left lower leg: No edema.   Skin:     General: Skin is warm and dry.   Neurological:      Mental Status: She is alert and oriented to person, place, and time.   Psychiatric:         Mood and Affect: Mood normal.         Behavior: Behavior normal.

## 2024-10-08 ENCOUNTER — DOCUMENTATION (OUTPATIENT)
Dept: PULMONOLOGY | Facility: HOSPITAL | Age: 54
End: 2024-10-08
Payer: COMMERCIAL

## 2025-01-21 ENCOUNTER — APPOINTMENT (OUTPATIENT)
Dept: PRIMARY CARE | Facility: CLINIC | Age: 55
End: 2025-01-21
Payer: COMMERCIAL

## 2025-01-21 VITALS
BODY MASS INDEX: 23.92 KG/M2 | DIASTOLIC BLOOD PRESSURE: 60 MMHG | WEIGHT: 135 LBS | HEART RATE: 75 BPM | SYSTOLIC BLOOD PRESSURE: 100 MMHG | OXYGEN SATURATION: 98 %

## 2025-01-21 DIAGNOSIS — R10.826 EPIGASTRIC ABDOMINAL TENDERNESS WITH REBOUND TENDERNESS: ICD-10-CM

## 2025-01-21 DIAGNOSIS — R10.10 PAIN OF UPPER ABDOMEN: Primary | ICD-10-CM

## 2025-01-21 DIAGNOSIS — L98.9 FACIAL SKIN LESION: ICD-10-CM

## 2025-01-21 PROCEDURE — 99213 OFFICE O/P EST LOW 20 MIN: CPT | Performed by: INTERNAL MEDICINE

## 2025-01-21 PROCEDURE — 1036F TOBACCO NON-USER: CPT | Performed by: INTERNAL MEDICINE

## 2025-01-21 RX ORDER — PANTOPRAZOLE SODIUM 40 MG/1
40 TABLET, DELAYED RELEASE ORAL DAILY
Qty: 30 TABLET | Refills: 1 | Status: CANCELLED | OUTPATIENT
Start: 2025-01-21 | End: 2025-03-22

## 2025-01-21 NOTE — PROGRESS NOTES
Subjective   Patient ID: Paradise Paris is a 54 y.o. female who presents for Follow-up (Patient here for follow-up visit ).    HPI   The patient presents to the office with C/O intermittent RUQ abdominal pain. No associated symptoms, no change in bowel habits. Noticed that larger meals provoke the pain.     Review of Systems  Abdominal pain  Objective   /60   Pulse 75   Wt 61.2 kg (135 lb)   SpO2 98%   BMI 23.92 kg/m²     Physical Exam  NAD. Cooperative.  Lungs CTA  Heart: RRR  Abdomen: epigastric tenderness to deep palpation     Assessment/Plan   Diagnoses and all orders for this visit:  Pain of upper abdomen  -     Referral to Gastroenterology; Future  Epigastric abdominal tenderness with rebound tenderness  Discussed and recommended a course of PPI, the patient declined.

## 2025-04-17 ENCOUNTER — OFFICE VISIT (OUTPATIENT)
Dept: GASTROENTEROLOGY | Facility: CLINIC | Age: 55
End: 2025-04-17
Payer: COMMERCIAL

## 2025-04-17 VITALS
HEART RATE: 74 BPM | BODY MASS INDEX: 24.1 KG/M2 | SYSTOLIC BLOOD PRESSURE: 97 MMHG | DIASTOLIC BLOOD PRESSURE: 58 MMHG | WEIGHT: 136 LBS | TEMPERATURE: 97.3 F | HEIGHT: 63 IN

## 2025-04-17 DIAGNOSIS — K76.0 HEPATIC STEATOSIS: Primary | ICD-10-CM

## 2025-04-17 DIAGNOSIS — D75.1 POLYCYTHEMIA: ICD-10-CM

## 2025-04-17 DIAGNOSIS — R10.10 PAIN OF UPPER ABDOMEN: ICD-10-CM

## 2025-04-17 PROCEDURE — 1036F TOBACCO NON-USER: CPT | Performed by: NURSE PRACTITIONER

## 2025-04-17 PROCEDURE — 3008F BODY MASS INDEX DOCD: CPT | Performed by: NURSE PRACTITIONER

## 2025-04-17 PROCEDURE — 99214 OFFICE O/P EST MOD 30 MIN: CPT | Performed by: NURSE PRACTITIONER

## 2025-04-17 PROCEDURE — 99204 OFFICE O/P NEW MOD 45 MIN: CPT | Performed by: NURSE PRACTITIONER

## 2025-04-17 RX ORDER — CYANOCOBALAMIN (VITAMIN B-12) 250 MCG
250 TABLET ORAL DAILY
COMMUNITY

## 2025-04-17 RX ORDER — TRETINOIN 0.25 MG/G
CREAM TOPICAL
COMMUNITY
Start: 2025-02-25

## 2025-04-17 RX ORDER — IBUPROFEN 100 MG/5ML
1000 SUSPENSION, ORAL (FINAL DOSE FORM) ORAL DAILY
COMMUNITY

## 2025-04-17 RX ORDER — ASPIRIN 325 MG
50 TABLET, DELAYED RELEASE (ENTERIC COATED) ORAL DAILY
COMMUNITY

## 2025-04-17 ASSESSMENT — ENCOUNTER SYMPTOMS
CONSTITUTIONAL NEGATIVE: 1
CARDIOVASCULAR NEGATIVE: 1
ALLERGIC/IMMUNOLOGIC NEGATIVE: 1
HEMATOLOGIC/LYMPHATIC NEGATIVE: 1
PSYCHIATRIC NEGATIVE: 1
RESPIRATORY NEGATIVE: 1
MUSCULOSKELETAL NEGATIVE: 1
ABDOMINAL PAIN: 1
EYES NEGATIVE: 1
ENDOCRINE NEGATIVE: 1
NEUROLOGICAL NEGATIVE: 1

## 2025-04-17 ASSESSMENT — PAIN SCALES - GENERAL: PAINLEVEL_OUTOF10: 0-NO PAIN

## 2025-04-17 NOTE — PATIENT INSTRUCTIONS
Hepatic steatosis-I would recommend an ultrasound of the gall bladder and labs for cbc, cmp, lipid panel.     Polycythemia- I will order labs for ferritin, iron, folate, hemochromotosis study.    RUQ/ post parandial pain- I will order the ultrasound of the RUQ to assess the gall bladder and liver. I would recommend a h-pylori breath test as well.    Based on your results this will determine follow up. I would recommend follow up at  with John Hunter CNP

## 2025-04-17 NOTE — PROGRESS NOTES
Subjective   Patient ID: Paradise Paris is a 54 y.o. female who presents for New Patient Visit (New Patient).  HPI  54-year-old female for new patient visit for evaluation of upper abdominal pain  Medical history includes elevated hemoglobin, hypercholesterolemia  FHX ;dad -stomach cancer age 67  Previously seen by Dr. Nadiya Rios 2022 for abdominal pain at that time she had a normal ultrasound  12/26/2019 colonoscopy showed hemorrhoids, 3 mm polyp that was hyperplastic  5/9/2023 negative Cologuard study  4/19/2024 labs reviewed ferritin 218  Iron 147  TSH 5.77  Normal CMP  Hemoglobin A1c 4.7%  H&H 15.8 and 46.3  Has fatty liver and sometimes feels RUQ tenderness  Occasional pinching feeling at times  If eats heavier or eats too much food   No nausea   Tries to eat less to less   BM : daily to twice daily  Sometimes feels complete  Fiber- fruits & vegetables  Water- daily  Exercise- yes daily      Review of Systems   Constitutional: Negative.    HENT: Negative.     Eyes: Negative.    Respiratory: Negative.     Cardiovascular: Negative.    Gastrointestinal:  Positive for abdominal pain.   Endocrine: Negative.    Genitourinary: Negative.    Musculoskeletal: Negative.    Skin: Negative.    Allergic/Immunologic: Negative.    Neurological: Negative.    Hematological: Negative.    Psychiatric/Behavioral: Negative.         Objective   Physical Exam  Constitutional:       Appearance: Normal appearance.   HENT:      Head: Normocephalic.      Nose: Nose normal.      Mouth/Throat:      Mouth: Mucous membranes are moist.   Eyes:      Pupils: Pupils are equal, round, and reactive to light.   Cardiovascular:      Rate and Rhythm: Normal rate and regular rhythm.      Pulses: Normal pulses.      Heart sounds: Normal heart sounds.   Pulmonary:      Effort: Pulmonary effort is normal.      Breath sounds: Normal breath sounds.   Abdominal:      General: Bowel sounds are normal.      Palpations: Abdomen is soft.   Musculoskeletal:          General: Normal range of motion.      Cervical back: Normal range of motion and neck supple.   Skin:     General: Skin is warm and dry.   Neurological:      Mental Status: She is alert.   Psychiatric:         Mood and Affect: Mood normal.         Assessment/Plan        Hepatic steatosis-I would recommend an ultrasound of the gall bladder and labs for cbc, cmp, lipid panel.     Polycythemia- I will order labs for ferritin, iron, folate, hemochromotosis study.    RUQ/ post parandial pain- I will order the ultrasound of the RUQ to assess the gall bladder and liver. I would recommend a h-pylori breath test as well.    Based on your results this will determine follow up. I would recommend follow up at  with BHARATH Raines CNP 04/17/25 8:36 AM

## 2025-04-18 LAB
ALBUMIN SERPL-MCNC: 4.6 G/DL (ref 3.6–5.1)
ALP SERPL-CCNC: 89 U/L (ref 37–153)
ALT SERPL-CCNC: 13 U/L (ref 6–29)
ANION GAP SERPL CALCULATED.4IONS-SCNC: 8 MMOL/L (CALC) (ref 7–17)
AST SERPL-CCNC: 15 U/L (ref 10–35)
BILIRUB SERPL-MCNC: 1 MG/DL (ref 0.2–1.2)
BUN SERPL-MCNC: 15 MG/DL (ref 7–25)
CALCIUM SERPL-MCNC: 9.5 MG/DL (ref 8.6–10.4)
CHLORIDE SERPL-SCNC: 105 MMOL/L (ref 98–110)
CHOLEST SERPL-MCNC: 200 MG/DL
CHOLEST/HDLC SERPL: 4 (CALC)
CO2 SERPL-SCNC: 29 MMOL/L (ref 20–32)
CREAT SERPL-MCNC: 0.65 MG/DL (ref 0.5–1.03)
EGFRCR SERPLBLD CKD-EPI 2021: 105 ML/MIN/1.73M2
ERYTHROCYTE [DISTWIDTH] IN BLOOD BY AUTOMATED COUNT: 12.6 % (ref 11–15)
FERRITIN SERPL-MCNC: 151 NG/ML (ref 16–232)
FOLATE SERPL-MCNC: 15.4 NG/ML
GLUCOSE SERPL-MCNC: 82 MG/DL (ref 65–99)
HCT VFR BLD AUTO: 44.9 % (ref 35–45)
HDLC SERPL-MCNC: 50 MG/DL
HFE GENE MUT ANL BLD/T: NORMAL
HGB BLD-MCNC: 15.5 G/DL (ref 11.7–15.5)
IRON SATN MFR SERPL: 44 % (CALC) (ref 16–45)
IRON SERPL-MCNC: 131 MCG/DL (ref 45–160)
LDLC SERPL CALC-MCNC: 130 MG/DL (CALC)
MCH RBC QN AUTO: 32.1 PG (ref 27–33)
MCHC RBC AUTO-ENTMCNC: 34.5 G/DL (ref 32–36)
MCV RBC AUTO: 93 FL (ref 80–100)
NONHDLC SERPL-MCNC: 150 MG/DL (CALC)
PLATELET # BLD AUTO: 164 THOUSAND/UL (ref 140–400)
PMV BLD REES-ECKER: 10.8 FL (ref 7.5–12.5)
POTASSIUM SERPL-SCNC: 4.2 MMOL/L (ref 3.5–5.3)
PROT SERPL-MCNC: 6.6 G/DL (ref 6.1–8.1)
RBC # BLD AUTO: 4.83 MILLION/UL (ref 3.8–5.1)
SODIUM SERPL-SCNC: 142 MMOL/L (ref 135–146)
TIBC SERPL-MCNC: 296 MCG/DL (CALC) (ref 250–450)
TRIGL SERPL-MCNC: 98 MG/DL
UREA BREATH TEST QL: NOT DETECTED
WBC # BLD AUTO: 4 THOUSAND/UL (ref 3.8–10.8)

## 2025-04-21 ENCOUNTER — APPOINTMENT (OUTPATIENT)
Dept: RADIOLOGY | Facility: HOSPITAL | Age: 55
End: 2025-04-21
Payer: COMMERCIAL

## 2025-04-21 DIAGNOSIS — K76.0 HEPATIC STEATOSIS: ICD-10-CM

## 2025-04-21 DIAGNOSIS — R10.10 PAIN OF UPPER ABDOMEN: ICD-10-CM

## 2025-04-21 PROCEDURE — 76705 ECHO EXAM OF ABDOMEN: CPT

## 2025-04-21 PROCEDURE — 76705 ECHO EXAM OF ABDOMEN: CPT | Performed by: RADIOLOGY

## 2025-05-22 ENCOUNTER — APPOINTMENT (OUTPATIENT)
Dept: DERMATOLOGY | Facility: CLINIC | Age: 55
End: 2025-05-22
Payer: COMMERCIAL